# Patient Record
Sex: MALE | Race: WHITE | Employment: FULL TIME | URBAN - METROPOLITAN AREA
[De-identification: names, ages, dates, MRNs, and addresses within clinical notes are randomized per-mention and may not be internally consistent; named-entity substitution may affect disease eponyms.]

---

## 2017-05-15 ENCOUNTER — OFFICE VISIT (OUTPATIENT)
Dept: INTERNAL MEDICINE CLINIC | Facility: CLINIC | Age: 27
End: 2017-05-15

## 2017-05-15 VITALS
WEIGHT: 153 LBS | HEART RATE: 76 BPM | DIASTOLIC BLOOD PRESSURE: 76 MMHG | TEMPERATURE: 99 F | HEIGHT: 72 IN | BODY MASS INDEX: 20.72 KG/M2 | RESPIRATION RATE: 16 BRPM | SYSTOLIC BLOOD PRESSURE: 122 MMHG

## 2017-05-15 DIAGNOSIS — Q18.1 CYST ON EAR: Primary | ICD-10-CM

## 2017-05-15 DIAGNOSIS — F17.200 TOBACCO DEPENDENCE: ICD-10-CM

## 2017-05-15 PROCEDURE — 99203 OFFICE O/P NEW LOW 30 MIN: CPT | Performed by: NURSE PRACTITIONER

## 2017-05-15 NOTE — PROGRESS NOTES
Santa Rodriguez is a 32year old male who presents as a new patient to establish. HPI:   Pt complains of approx 3 day history of cyst behind left ear. Stated cysts was painful and yesterday drained out some pus and then clear colored fluid.  This reduced Temp(Src) 99 °F (37.2 °C) (Oral)  Resp 16  Ht 72\"  Wt 153 lb  BMI 20.75 kg/m2  Body mass index is 20.75 kg/(m^2). GENERAL: well developed, well nourished,in no apparent distress  HEENT: atraumatic, normocephalic, throat is clear.  Bilat TM clear with eas

## 2021-05-19 ENCOUNTER — HOSPITAL ENCOUNTER (OUTPATIENT)
Age: 31
Discharge: HOME | End: 2021-05-19
Payer: COMMERCIAL

## 2021-05-19 VITALS
OXYGEN SATURATION: 100 % | DIASTOLIC BLOOD PRESSURE: 95 MMHG | TEMPERATURE: 98 F | SYSTOLIC BLOOD PRESSURE: 154 MMHG | BODY MASS INDEX: 20.32 KG/M2 | HEART RATE: 81 BPM | WEIGHT: 150 LBS | HEIGHT: 72 IN | RESPIRATION RATE: 18 BRPM

## 2021-05-19 DIAGNOSIS — H65.02 NON-RECURRENT ACUTE SEROUS OTITIS MEDIA OF LEFT EAR: Primary | ICD-10-CM

## 2021-05-19 PROCEDURE — 99202 OFFICE O/P NEW SF 15 MIN: CPT | Performed by: NURSE PRACTITIONER

## 2021-05-19 PROCEDURE — 87880 STREP A ASSAY W/OPTIC: CPT | Performed by: NURSE PRACTITIONER

## 2021-05-19 RX ORDER — AMOXICILLIN 875 MG/1
875 TABLET, COATED ORAL 2 TIMES DAILY
Qty: 20 TABLET | Refills: 0 | Status: SHIPPED | OUTPATIENT
Start: 2021-05-19 | End: 2021-05-29

## 2021-05-19 NOTE — ED INITIAL ASSESSMENT (HPI)
Pt c/o 3 days history of sore throat and left ear pain. Pt doesn't think he has covid. Pt has had 1 dose of the Pfizer vaccine.

## 2021-05-20 NOTE — ED PROVIDER NOTES
Patient Seen in: [facility]      History   Patient presents with:  Sore Throat  Ear Problem    Stated Complaint: Sore Throat; Possible Ear Infection (Left Ear)    HPI/Subjective:   75-year-old male presents to immediate care with lef Mouth/Throat: Tonsils: No tonsillar exudate or tonsillar abscesses. 0 on the right. 0 on the left. Cardiovascular:      Rate and Rhythm: Normal rate.    Pulmonary:      Effort: Pulmonary effort is normal.   Musculoskeletal:         General: Normal ra

## (undated) NOTE — MR AVS SNAPSHOT
[facility] [place] [id number] [phone]               Thank you for choosing us for your health care visit with Solis Matthews NP.   We are glad to serve you and happy to provide you with this summary Medical Issues Discussed Today     Cyst on ear    Tobacco dependence      Instructions and Information about Your Health     None      Allergies as of May 15, 2017     No Known Allergies                Today's Vital Signs     BP Pulse Temp Height Weight BM